# Patient Record
(demographics unavailable — no encounter records)

---

## 2024-10-14 NOTE — HEALTH RISK ASSESSMENT
[Yes] : Yes [Monthly or less (1 pt)] : Monthly or less (1 point) [1 or 2 (0 pts)] : 1 or 2 (0 points) [Never (0 pts)] : Never (0 points) [No falls in past year] : Patient reported no falls in the past year [0] : 2) Feeling down, depressed, or hopeless: Not at all (0) [PHQ-2 Negative - No further assessment needed] : PHQ-2 Negative - No further assessment needed [With Patient/Caregiver] : , with patient/caregiver [Designated Healthcare Proxy] : Designated healthcare proxy [Name: ___] : Health Care Proxy's Name: [unfilled]  [Relationship: ___] : Relationship: [unfilled] [Former] : Former [10-14] : 10-14 [> 15 Years] : > 15 Years [de-identified] : Neurology [Audit-CScore] : 1 [de-identified] : Average, 20 minute daily stationary bike [de-identified] : Average [AdvancecareDate] : 02/24 [de-identified] : Quit in 1999

## 2024-10-14 NOTE — ASSESSMENT
[FreeTextEntry1] : Follow up of a 74 y/o female with PMHx of arthritis, HLD, aneurysm, DJD, HTN, macular degeneration, CAD with stent.  -Care plan reviewed -Labs ordered  -Medications reviewed  -Recommend taking lions keshav supplement -Continue to follow with neurology and cardiology as scheduled  - flu shot administered today -RTC 3 months

## 2024-10-14 NOTE — ASSESSMENT
[FreeTextEntry1] : Follow up of a 76 y/o female with PMHx of arthritis, HLD, aneurysm, DJD, HTN, macular degeneration, CAD with stent.  -Care plan reviewed -Labs ordered  -Medications reviewed  -Recommend taking lions keshav supplement -Continue to follow with neurology and cardiology as scheduled  - flu shot administered today -RTC 3 months

## 2024-10-14 NOTE — HISTORY OF PRESENT ILLNESS
[FreeTextEntry1] : Follow up of a 74 y/o female with PMHx of arthritis, HLD, aneurysm, DJD, HTN, macular degeneration, CAD with stent. Following up with neurology next week. Denies nausea, vomiting, diarrhea, congestion, headache, dizziness.  [de-identified] : Follow up of a 74 y/o female with PMHx of arthritis, HLD, aneurysm, DJD, HTN, macular degeneration, CAD with stent. Following up with neurology next week. Denies nausea, vomiting, diarrhea, congestion, headache, dizziness.

## 2024-10-14 NOTE — COUNSELING
[Fall prevention counseling provided] : Fall prevention counseling provided [Adequate lighting] : Adequate lighting [No throw rugs] : No throw rugs [Behavioral health counseling provided] : Behavioral health counseling provided [Sleep ___ hours/day] : Sleep [unfilled] hours/day [Engage in a relaxing activity] : Engage in a relaxing activity [Potential consequences of obesity discussed] : Potential consequences of obesity discussed [Benefits of weight loss discussed] : Benefits of weight loss discussed [Encouraged to maintain food diary] : Encouraged to maintain food diary [Encouraged to increase physical activity] : Encouraged to increase physical activity [None] : None [Good understanding] : Patient has a good understanding of lifestyle changes and steps needed to achieve self management goal [FreeTextEntry2] : former

## 2024-10-14 NOTE — HISTORY OF PRESENT ILLNESS
[FreeTextEntry1] : Follow up of a 74 y/o female with PMHx of arthritis, HLD, aneurysm, DJD, HTN, macular degeneration, CAD with stent. Following up with neurology next week. Denies nausea, vomiting, diarrhea, congestion, headache, dizziness.  [de-identified] : Follow up of a 76 y/o female with PMHx of arthritis, HLD, aneurysm, DJD, HTN, macular degeneration, CAD with stent. Following up with neurology next week. Denies nausea, vomiting, diarrhea, congestion, headache, dizziness.

## 2024-10-14 NOTE — PHYSICAL EXAM
[No Acute Distress] : no acute distress [Well Nourished] : well nourished [Well Developed] : well developed [Well-Appearing] : well-appearing [Normal Sclera/Conjunctiva] : normal sclera/conjunctiva [PERRL] : pupils equal round and reactive to light [EOMI] : extraocular movements intact [Normal Outer Ear/Nose] : the outer ears and nose were normal in appearance [Normal Oropharynx] : the oropharynx was normal [No JVD] : no jugular venous distention [No Lymphadenopathy] : no lymphadenopathy [Supple] : supple [Thyroid Normal, No Nodules] : the thyroid was normal and there were no nodules present [No Respiratory Distress] : no respiratory distress  [No Accessory Muscle Use] : no accessory muscle use [Clear to Auscultation] : lungs were clear to auscultation bilaterally [Normal Rate] : normal rate  [Regular Rhythm] : with a regular rhythm [Normal S1, S2] : normal S1 and S2 [No Murmur] : no murmur heard [No Carotid Bruits] : no carotid bruits [No Abdominal Bruit] : a ~M bruit was not heard ~T in the abdomen [No Varicosities] : no varicosities [Pedal Pulses Present] : the pedal pulses are present [No Edema] : there was no peripheral edema [No Palpable Aorta] : no palpable aorta [No Extremity Clubbing/Cyanosis] : no extremity clubbing/cyanosis [Soft] : abdomen soft [Non Tender] : non-tender [Non-distended] : non-distended [No Masses] : no abdominal mass palpated [No HSM] : no HSM [Normal Bowel Sounds] : normal bowel sounds [No CVA Tenderness] : no CVA  tenderness [No Spinal Tenderness] : no spinal tenderness [No Joint Swelling] : no joint swelling [Grossly Normal Strength/Tone] : grossly normal strength/tone [No Rash] : no rash [Coordination Grossly Intact] : coordination grossly intact [No Focal Deficits] : no focal deficits [Normal Gait] : normal gait [Deep Tendon Reflexes (DTR)] : deep tendon reflexes were 2+ and symmetric [Normal Affect] : the affect was normal [Normal Insight/Judgement] : insight and judgment were intact [Normal Posterior Cervical Nodes] : no posterior cervical lymphadenopathy [Normal Anterior Cervical Nodes] : no anterior cervical lymphadenopathy

## 2024-10-14 NOTE — REVIEW OF SYSTEMS
[Joint Pain] : joint pain [Joint Stiffness] : joint stiffness [Memory Loss] : memory loss [Negative] : Neurological [Headache] : no headache [Dizziness] : no dizziness [Fainting] : no fainting [Confusion] : no confusion [Unsteady Walking] : no ataxia

## 2024-10-14 NOTE — HEALTH RISK ASSESSMENT
[Yes] : Yes [Monthly or less (1 pt)] : Monthly or less (1 point) [1 or 2 (0 pts)] : 1 or 2 (0 points) [Never (0 pts)] : Never (0 points) [No falls in past year] : Patient reported no falls in the past year [0] : 2) Feeling down, depressed, or hopeless: Not at all (0) [PHQ-2 Negative - No further assessment needed] : PHQ-2 Negative - No further assessment needed [With Patient/Caregiver] : , with patient/caregiver [Designated Healthcare Proxy] : Designated healthcare proxy [Name: ___] : Health Care Proxy's Name: [unfilled]  [Relationship: ___] : Relationship: [unfilled] [Former] : Former [10-14] : 10-14 [> 15 Years] : > 15 Years [de-identified] : Neurology [Audit-CScore] : 1 [de-identified] : Average, 20 minute daily stationary bike [de-identified] : Average [AdvancecareDate] : 02/24 [de-identified] : Quit in 1999

## 2025-01-10 NOTE — PHYSICAL EXAM
Please advise Pt that her MRI showed mild inflammation along the sciatic joint on the left. It also showed a posterior disc protrusion and moderate L5-S1 foramina stenosis. Next step would be ref to ortho if she would like. [No Acute Distress] : no acute distress [Well Nourished] : well nourished [Well Developed] : well developed [Well-Appearing] : well-appearing [Normal Sclera/Conjunctiva] : normal sclera/conjunctiva [PERRL] : pupils equal round and reactive to light [EOMI] : extraocular movements intact [Normal Outer Ear/Nose] : the outer ears and nose were normal in appearance [Normal Oropharynx] : the oropharynx was normal [No JVD] : no jugular venous distention [No Lymphadenopathy] : no lymphadenopathy [Supple] : supple [Thyroid Normal, No Nodules] : the thyroid was normal and there were no nodules present [No Respiratory Distress] : no respiratory distress  [No Accessory Muscle Use] : no accessory muscle use [Clear to Auscultation] : lungs were clear to auscultation bilaterally [Normal Rate] : normal rate  [Regular Rhythm] : with a regular rhythm [Normal S1, S2] : normal S1 and S2 [No Murmur] : no murmur heard [No Carotid Bruits] : no carotid bruits [No Abdominal Bruit] : a ~M bruit was not heard ~T in the abdomen [No Varicosities] : no varicosities [Pedal Pulses Present] : the pedal pulses are present [No Edema] : there was no peripheral edema [No Palpable Aorta] : no palpable aorta [No Extremity Clubbing/Cyanosis] : no extremity clubbing/cyanosis [Soft] : abdomen soft [Non Tender] : non-tender [Non-distended] : non-distended [No Masses] : no abdominal mass palpated [No HSM] : no HSM [Normal Bowel Sounds] : normal bowel sounds [Normal Posterior Cervical Nodes] : no posterior cervical lymphadenopathy [Normal Anterior Cervical Nodes] : no anterior cervical lymphadenopathy [No CVA Tenderness] : no CVA  tenderness [No Spinal Tenderness] : no spinal tenderness [No Joint Swelling] : no joint swelling [Grossly Normal Strength/Tone] : grossly normal strength/tone [No Rash] : no rash [Coordination Grossly Intact] : coordination grossly intact [No Focal Deficits] : no focal deficits [Normal Gait] : normal gait [Deep Tendon Reflexes (DTR)] : deep tendon reflexes were 2+ and symmetric [Normal Affect] : the affect was normal [Normal Insight/Judgement] : insight and judgment were intact

## 2025-01-13 NOTE — HEALTH RISK ASSESSMENT
[Yes] : Yes [Monthly or less (1 pt)] : Monthly or less (1 point) [1 or 2 (0 pts)] : 1 or 2 (0 points) [Never (0 pts)] : Never (0 points) [No falls in past year] : Patient reported no falls in the past year [0] : 2) Feeling down, depressed, or hopeless: Not at all (0) [PHQ-2 Negative - No further assessment needed] : PHQ-2 Negative - No further assessment needed [With Patient/Caregiver] : , with patient/caregiver [Designated Healthcare Proxy] : Designated healthcare proxy [Name: ___] : Health Care Proxy's Name: [unfilled]  [Relationship: ___] : Relationship: [unfilled] [Former] : Former [10-14] : 10-14 [> 15 Years] : > 15 Years [de-identified] : Neurology [Audit-CScore] : 1 [de-identified] : Average, 20 minute daily stationary bike [de-identified] : Average [AdvancecareDate] : 02/24 [de-identified] : Quit in 1999

## 2025-01-13 NOTE — HISTORY OF PRESENT ILLNESS
[FreeTextEntry1] : Follow up of a 75 y/o female with PMHx of arthritis, HLD, aneurysm, DJD, HTN, macular degeneration, CAD with stent. Denies nausea, vomiting, diarrhea, congestion, headache, dizziness.  [de-identified] : Follow up of a 76 y/o female with PMHx of arthritis, HLD, aneurysm, DJD, HTN, macular degeneration, CAD with stent. Following up with neurology next week. Denies nausea, vomiting, diarrhea, congestion, headache, dizziness. c/o runny nose x2 weeks.  Macular degeneration, sees ophthalmology for injections. denies cough wheeze exercises for gait instability

## 2025-01-13 NOTE — HEALTH RISK ASSESSMENT
[Yes] : Yes [Monthly or less (1 pt)] : Monthly or less (1 point) [1 or 2 (0 pts)] : 1 or 2 (0 points) [Never (0 pts)] : Never (0 points) [No falls in past year] : Patient reported no falls in the past year [0] : 2) Feeling down, depressed, or hopeless: Not at all (0) [PHQ-2 Negative - No further assessment needed] : PHQ-2 Negative - No further assessment needed [With Patient/Caregiver] : , with patient/caregiver [Designated Healthcare Proxy] : Designated healthcare proxy [Name: ___] : Health Care Proxy's Name: [unfilled]  [Relationship: ___] : Relationship: [unfilled] [Former] : Former [10-14] : 10-14 [> 15 Years] : > 15 Years [de-identified] : Neurology [Audit-CScore] : 1 [de-identified] : Average, 20 minute daily stationary bike [de-identified] : Average [AdvancecareDate] : 02/24 [de-identified] : Quit in 1999

## 2025-01-13 NOTE — HISTORY OF PRESENT ILLNESS
[FreeTextEntry1] : Follow up of a 77 y/o female with PMHx of arthritis, HLD, aneurysm, DJD, HTN, macular degeneration, CAD with stent. Denies nausea, vomiting, diarrhea, congestion, headache, dizziness.  [de-identified] : Follow up of a 74 y/o female with PMHx of arthritis, HLD, aneurysm, DJD, HTN, macular degeneration, CAD with stent. Following up with neurology next week. Denies nausea, vomiting, diarrhea, congestion, headache, dizziness. c/o runny nose x2 weeks.  Macular degeneration, sees ophthalmology for injections. denies cough wheeze exercises for gait instability

## 2025-01-13 NOTE — REVIEW OF SYSTEMS
[Joint Pain] : joint pain [Joint Stiffness] : joint stiffness [Memory Loss] : memory loss [Negative] : Heme/Lymph [Headache] : no headache [Dizziness] : no dizziness [Fainting] : no fainting [Confusion] : no confusion [Unsteady Walking] : no ataxia

## 2025-04-10 NOTE — ASSESSMENT
[FreeTextEntry1] : Follow up of a 75 y/o female with PMHx of arthritis, HLD, aneurysm, DJD, HTN, macular degeneration, CAD with stent. Denies nausea, vomiting, diarrhea, congestion, headache, dizziness. For labs and Meds  -Care plan reviewed -Labs ordered  -Medications reviewed   -Continue to follow with neurology and cardiology -RTC 3 months

## 2025-04-10 NOTE — HISTORY OF PRESENT ILLNESS
[FreeTextEntry1] : Follow up of a 77 y/o female with PMHx of arthritis, HLD, aneurysm, DJD, HTN, macular degeneration, CAD with stent. Denies nausea, vomiting, diarrhea, congestion, headache, dizziness. For labs and Meds [de-identified] : Follow up of a 75 y/o female with PMHx of arthritis, HLD, aneurysm, DJD, HTN, macular degeneration, CAD with stent. Denies nausea, vomiting, diarrhea, congestion, headache, dizziness. For labs and Meds

## 2025-04-10 NOTE — ASSESSMENT
[FreeTextEntry1] : Follow up of a 77 y/o female with PMHx of arthritis, HLD, aneurysm, DJD, HTN, macular degeneration, CAD with stent. Denies nausea, vomiting, diarrhea, congestion, headache, dizziness. For labs and Meds  -Care plan reviewed -Labs ordered  -Medications reviewed   -Continue to follow with neurology and cardiology -RTC 3 months  Opioid Pregnancy And Lactation Text: These medications can lead to premature delivery and should be avoided during pregnancy. These medications are also present in breast milk in small amounts.

## 2025-04-10 NOTE — HEALTH RISK ASSESSMENT
[Yes] : Yes [Monthly or less (1 pt)] : Monthly or less (1 point) [1 or 2 (0 pts)] : 1 or 2 (0 points) [Never (0 pts)] : Never (0 points) [No falls in past year] : Patient reported no falls in the past year [0] : 2) Feeling down, depressed, or hopeless: Not at all (0) [PHQ-2 Negative - No further assessment needed] : PHQ-2 Negative - No further assessment needed [With Patient/Caregiver] : , with patient/caregiver [Designated Healthcare Proxy] : Designated healthcare proxy [Name: ___] : Health Care Proxy's Name: [unfilled]  [Relationship: ___] : Relationship: [unfilled] [Former] : Former [10-14] : 10-14 [> 15 Years] : > 15 Years [de-identified] : Neurology [Audit-CScore] : 1 [de-identified] : Average, 20 minute daily stationary bike [de-identified] : Average [AdvancecareDate] : 02/24 [de-identified] : Quit in 1999

## 2025-04-10 NOTE — HISTORY OF PRESENT ILLNESS
[FreeTextEntry1] : Follow up of a 75 y/o female with PMHx of arthritis, HLD, aneurysm, DJD, HTN, macular degeneration, CAD with stent. Denies nausea, vomiting, diarrhea, congestion, headache, dizziness. For labs and Meds [de-identified] : Follow up of a 75 y/o female with PMHx of arthritis, HLD, aneurysm, DJD, HTN, macular degeneration, CAD with stent. Denies nausea, vomiting, diarrhea, congestion, headache, dizziness. For labs and Meds

## 2025-04-10 NOTE — HEALTH RISK ASSESSMENT
[Yes] : Yes [Monthly or less (1 pt)] : Monthly or less (1 point) [1 or 2 (0 pts)] : 1 or 2 (0 points) [Never (0 pts)] : Never (0 points) [No falls in past year] : Patient reported no falls in the past year [0] : 2) Feeling down, depressed, or hopeless: Not at all (0) [PHQ-2 Negative - No further assessment needed] : PHQ-2 Negative - No further assessment needed [With Patient/Caregiver] : , with patient/caregiver [Designated Healthcare Proxy] : Designated healthcare proxy [Name: ___] : Health Care Proxy's Name: [unfilled]  [Relationship: ___] : Relationship: [unfilled] [Former] : Former [10-14] : 10-14 [> 15 Years] : > 15 Years [de-identified] : Neurology [Audit-CScore] : 1 [de-identified] : Average, 20 minute daily stationary bike [de-identified] : Average [AdvancecareDate] : 02/24 [de-identified] : Quit in 1999

## 2025-04-10 NOTE — PLAN
[FreeTextEntry1] : Follow up of a 77 y/o female with PMHx of arthritis, HLD, aneurysm, DJD, HTN, macular degeneration, CAD with stent. Denies nausea, vomiting, diarrhea, congestion, headache, dizziness. For labs and Meds  -Care plan reviewed -Labs ordered  -Medications reviewed   -Continue to follow with neurology and cardiology -RTC 3 months

## 2025-07-08 NOTE — HEALTH RISK ASSESSMENT
[Yes] : Yes [Monthly or less (1 pt)] : Monthly or less (1 point) [1 or 2 (0 pts)] : 1 or 2 (0 points) [Never (0 pts)] : Never (0 points) [No falls in past year] : Patient reported no falls in the past year [0] : 2) Feeling down, depressed, or hopeless: Not at all (0) [PHQ-2 Negative - No further assessment needed] : PHQ-2 Negative - No further assessment needed [With Patient/Caregiver] : , with patient/caregiver [Designated Healthcare Proxy] : Designated healthcare proxy [Name: ___] : Health Care Proxy's Name: [unfilled]  [Relationship: ___] : Relationship: [unfilled] [Former] : Former [10-14] : 10-14 [> 15 Years] : > 15 Years [de-identified] : Neurology [Audit-CScore] : 1 [de-identified] : Average, 20 minute daily stationary bike [de-identified] : Average [AdvancecareDate] : 02/24 [de-identified] : Quit in 1999

## 2025-07-08 NOTE — HISTORY OF PRESENT ILLNESS
[FreeTextEntry1] : Follow up of a 75 y/o female with PMHx of arthritis, HLD, aneurysm, DJD, HTN, macular degeneration, CAD with stent. Denies nausea, vomiting, diarrhea, congestion, headache, dizziness. For labs and Meds [de-identified] : Follow up of a 77 y/o female with PMHx of arthritis, HLD, aneurysm, DJD, HTN, macular degeneration, CAD with stent. Denies nausea, vomiting, diarrhea, congestion, headache, dizziness. For labs and Meds

## 2025-07-09 NOTE — REVIEW OF SYSTEMS
[Joint Pain] : joint pain [Joint Stiffness] : joint stiffness [Memory Loss] : memory loss [Negative] : Heme/Lymph [Fatigue] : fatigue [Headache] : headache [Dizziness] : dizziness [Fainting] : no fainting [Confusion] : no confusion [Unsteady Walking] : no ataxia

## 2025-07-09 NOTE — PLAN
[FreeTextEntry1] : Pt is a 77 y/o female with PMHx of arthritis, HLD, aneurysm, DJD, HTN, macular degeneration, CAD with stent who is presenting for a post-discharge follow-up. Pt states that on 7/2/2025 she presented to [facility] ER due to a headache and vomiting. She had a workout with neuro and they found a small brain aneurysm near her original site. Pt then had a lumbar puncture which revealed no active bleed and was discharged with a tension headache and to take excedrin. Pt states that on Friday she still had headache but was feeling better. On 7/5/2025 the pt presented to [facility] due to having the "worst headache of her life." Pt had a complete ophthalmology consult without any findings and a complete neuro consult with 2 CT scans and 1 MRi without any findings. Pt was discharged and was told it was migraines). Since the ER visit the pt BP has been ranging from 180s/110s to 130s/80s. Today pt states she feels better and headache dull.  Pt states the headache is located in the center of forehead and travels to back and had been taking excedrin w/o relief. Pt daughter days she had loss of appetite and lost 5lbs since last visit. Pt denies fever, chest pain, SOB, abd pain, and N/V/D.  -Care plan reviewed -Labs ordered  -Medications reviewed  -Continue to follow with neurology -RTC 1 month

## 2025-07-09 NOTE — HEALTH RISK ASSESSMENT
[Yes] : Yes [Monthly or less (1 pt)] : Monthly or less (1 point) [1 or 2 (0 pts)] : 1 or 2 (0 points) [Never (0 pts)] : Never (0 points) [No falls in past year] : Patient reported no falls in the past year [0] : 2) Feeling down, depressed, or hopeless: Not at all (0) [PHQ-2 Negative - No further assessment needed] : PHQ-2 Negative - No further assessment needed [With Patient/Caregiver] : , with patient/caregiver [Designated Healthcare Proxy] : Designated healthcare proxy [Name: ___] : Health Care Proxy's Name: [unfilled]  [Relationship: ___] : Relationship: [unfilled] [Former] : Former [10-14] : 10-14 [> 15 Years] : > 15 Years [de-identified] : Neurology [Audit-CScore] : 1 [de-identified] : Average, 20 minute daily stationary bike [de-identified] : Average [AdvancecareDate] : 02/24 [de-identified] : Quit in 1999

## 2025-07-09 NOTE — HISTORY OF PRESENT ILLNESS
[Family Member] : family member [FreeTextEntry2] : Pt is a 77 y/o female with PMHx of arthritis, HLD, aneurysm, DJD, HTN, macular degeneration, CAD with stent who is presenting for a post-discharge follow-up. Pt states that on 7/2/2025 she presented to [facility] ER due to a headache and vomiting. She had a workout with neuro and they found a small brain aneurysm near her original site. Pt then had a lumbar puncture which revealed no active bleed and was discharged with a tension headache and to take excedrin. Pt states that on Friday she still had headache but was feeling better. On 7/5/2025 the pt presented to [facility] due to having the "worst headache of her life." Pt had a complete ophthalmology consult without any findings and a complete neuro consult with 2 CT scans and 1 MRi without any findings. Pt was discharged and was told it was migraines). Since the ER visit the pt BP has been ranging from 180s/110s to 130s/80s. Today pt states she feels better and headache dull.  Pt states the headache is located in the center of forehead and travels to back and had been taking excedrin w/o relief. Pt daughter days she had loss of appetite and lost 5lbs since last visit. Pt denies fever, chest pain, SOB, abd pain, and N/V/D.

## 2025-07-17 NOTE — CARDIOLOGY SUMMARY
[de-identified] : 8/25/2022  LVEF 65-70% , grade I DD, RV size and function is normal [de-identified] : 8/24/2022 LM minor irreg, LAD pLAD 40%, mLAD 90%  , LCX minor irregularities, RCA p90%  mid 70%   EF65%

## 2025-07-17 NOTE — HISTORY OF PRESENT ILLNESS
[FreeTextEntry1] : 76F former smoker, HTN, HLD, unstable angina CAD/stents ALEX x2 to mRCA 8/2022 since on ASA only, last seen in office 2/2025, returns for follow up.    Recent lab LDL 50